# Patient Record
Sex: FEMALE | Race: WHITE | Employment: FULL TIME | URBAN - METROPOLITAN AREA
[De-identification: names, ages, dates, MRNs, and addresses within clinical notes are randomized per-mention and may not be internally consistent; named-entity substitution may affect disease eponyms.]

---

## 2022-09-19 ENCOUNTER — APPOINTMENT (OUTPATIENT)
Dept: CT IMAGING | Age: 47
End: 2022-09-19
Attending: PHYSICIAN ASSISTANT
Payer: COMMERCIAL

## 2022-09-19 ENCOUNTER — HOSPITAL ENCOUNTER (EMERGENCY)
Age: 47
Discharge: HOME | End: 2022-09-19
Attending: EMERGENCY MEDICINE
Payer: COMMERCIAL

## 2022-09-19 VITALS
SYSTOLIC BLOOD PRESSURE: 104 MMHG | HEIGHT: 65 IN | HEART RATE: 55 BPM | DIASTOLIC BLOOD PRESSURE: 56 MMHG | OXYGEN SATURATION: 96 % | TEMPERATURE: 98.1 F | WEIGHT: 130 LBS | RESPIRATION RATE: 15 BRPM | BODY MASS INDEX: 21.66 KG/M2

## 2022-09-19 DIAGNOSIS — I77.3 FIBROMUSCULAR DYSPLASIA OF CAROTID ARTERY (HCC): ICD-10-CM

## 2022-09-19 DIAGNOSIS — R53.81 MALAISE AND FATIGUE: ICD-10-CM

## 2022-09-19 DIAGNOSIS — R53.83 MALAISE AND FATIGUE: ICD-10-CM

## 2022-09-19 DIAGNOSIS — R40.0 DROWSINESS: Primary | ICD-10-CM

## 2022-09-19 LAB
ALBUMIN SERPL-MCNC: 3.9 G/DL (ref 3.5–5)
ALBUMIN/GLOB SERPL: 1 {RATIO} (ref 1.1–2.2)
ALP SERPL-CCNC: 123 U/L (ref 45–117)
ALT SERPL-CCNC: 24 U/L (ref 12–78)
AMPHET UR QL SCN: NEGATIVE
ANION GAP SERPL CALC-SCNC: 8 MMOL/L (ref 5–15)
APPEARANCE UR: CLEAR
AST SERPL-CCNC: 32 U/L (ref 15–37)
BACTERIA URNS QL MICRO: NEGATIVE /HPF
BARBITURATES UR QL SCN: POSITIVE
BASOPHILS # BLD: 0.1 K/UL (ref 0–0.1)
BASOPHILS NFR BLD: 2 % (ref 0–1)
BENZODIAZ UR QL: NEGATIVE
BILIRUB SERPL-MCNC: 0.3 MG/DL (ref 0.2–1)
BILIRUB UR QL: NEGATIVE
BUN SERPL-MCNC: 8 MG/DL (ref 6–20)
BUN/CREAT SERPL: 12 (ref 12–20)
CALCIUM SERPL-MCNC: 9.2 MG/DL (ref 8.5–10.1)
CANNABINOIDS UR QL SCN: NEGATIVE
CHLORIDE SERPL-SCNC: 102 MMOL/L (ref 97–108)
CO2 SERPL-SCNC: 30 MMOL/L (ref 21–32)
COCAINE UR QL SCN: NEGATIVE
COLOR UR: NORMAL
COMMENT, HOLDF: NORMAL
CREAT SERPL-MCNC: 0.69 MG/DL (ref 0.55–1.02)
DIFFERENTIAL METHOD BLD: ABNORMAL
DRUG SCRN COMMENT,DRGCM: ABNORMAL
EOSINOPHIL # BLD: 0.2 K/UL (ref 0–0.4)
EOSINOPHIL NFR BLD: 6 % (ref 0–7)
EPITH CASTS URNS QL MICRO: NORMAL /LPF
ERYTHROCYTE [DISTWIDTH] IN BLOOD BY AUTOMATED COUNT: 12.6 % (ref 11.5–14.5)
ETHANOL SERPL-MCNC: <10 MG/DL
GLOBULIN SER CALC-MCNC: 4.1 G/DL (ref 2–4)
GLUCOSE BLD STRIP.AUTO-MCNC: 94 MG/DL (ref 65–117)
GLUCOSE SERPL-MCNC: 81 MG/DL (ref 65–100)
GLUCOSE UR STRIP.AUTO-MCNC: NEGATIVE MG/DL
HCT VFR BLD AUTO: 44.5 % (ref 35–47)
HGB BLD-MCNC: 14.6 G/DL (ref 11.5–16)
HGB UR QL STRIP: NEGATIVE
IMM GRANULOCYTES # BLD AUTO: 0 K/UL (ref 0–0.04)
IMM GRANULOCYTES NFR BLD AUTO: 0 % (ref 0–0.5)
INR PPP: 0.9 (ref 0.9–1.1)
KETONES UR QL STRIP.AUTO: NEGATIVE MG/DL
LEUKOCYTE ESTERASE UR QL STRIP.AUTO: NEGATIVE
LYMPHOCYTES # BLD: 1.5 K/UL (ref 0.8–3.5)
LYMPHOCYTES NFR BLD: 45 % (ref 12–49)
MAGNESIUM SERPL-MCNC: 2.2 MG/DL (ref 1.6–2.4)
MCH RBC QN AUTO: 31.3 PG (ref 26–34)
MCHC RBC AUTO-ENTMCNC: 32.8 G/DL (ref 30–36.5)
MCV RBC AUTO: 95.5 FL (ref 80–99)
METHADONE UR QL: NEGATIVE
MONOCYTES # BLD: 0.4 K/UL (ref 0–1)
MONOCYTES NFR BLD: 12 % (ref 5–13)
NEUTS SEG # BLD: 1.2 K/UL (ref 1.8–8)
NEUTS SEG NFR BLD: 35 % (ref 32–75)
NITRITE UR QL STRIP.AUTO: NEGATIVE
NRBC # BLD: 0 K/UL (ref 0–0.01)
NRBC BLD-RTO: 0 PER 100 WBC
OPIATES UR QL: NEGATIVE
PCP UR QL: NEGATIVE
PH UR STRIP: 7 [PH] (ref 5–8)
PHENOBARB SERPL-MCNC: 28.5 UG/ML (ref 15–40)
PLATELET # BLD AUTO: 170 K/UL (ref 150–400)
PMV BLD AUTO: 12.4 FL (ref 8.9–12.9)
POTASSIUM SERPL-SCNC: 3.9 MMOL/L (ref 3.5–5.1)
PROT SERPL-MCNC: 8 G/DL (ref 6.4–8.2)
PROT UR STRIP-MCNC: NEGATIVE MG/DL
PROTHROMBIN TIME: 9.7 SEC (ref 9–11.1)
RBC # BLD AUTO: 4.66 M/UL (ref 3.8–5.2)
RBC #/AREA URNS HPF: NORMAL /HPF (ref 0–5)
SAMPLES BEING HELD,HOLD: NORMAL
SERVICE CMNT-IMP: NORMAL
SODIUM SERPL-SCNC: 140 MMOL/L (ref 136–145)
SP GR UR REFRACTOMETRY: <1.005 (ref 1–1.03)
TROPONIN-HIGH SENSITIVITY: <4 NG/L (ref 0–51)
UR CULT HOLD, URHOLD: NORMAL
UROBILINOGEN UR QL STRIP.AUTO: 0.2 EU/DL (ref 0.2–1)
WBC # BLD AUTO: 3.3 K/UL (ref 3.6–11)
WBC URNS QL MICRO: NORMAL /HPF (ref 0–4)

## 2022-09-19 PROCEDURE — 0042T CT CODE NEURO PERF W CBF: CPT

## 2022-09-19 PROCEDURE — 80053 COMPREHEN METABOLIC PANEL: CPT

## 2022-09-19 PROCEDURE — 85610 PROTHROMBIN TIME: CPT

## 2022-09-19 PROCEDURE — 70450 CT HEAD/BRAIN W/O DYE: CPT

## 2022-09-19 PROCEDURE — 80184 ASSAY OF PHENOBARBITAL: CPT

## 2022-09-19 PROCEDURE — 81001 URINALYSIS AUTO W/SCOPE: CPT

## 2022-09-19 PROCEDURE — 74011000636 HC RX REV CODE- 636: Performed by: EMERGENCY MEDICINE

## 2022-09-19 PROCEDURE — 84484 ASSAY OF TROPONIN QUANT: CPT

## 2022-09-19 PROCEDURE — 80307 DRUG TEST PRSMV CHEM ANLYZR: CPT

## 2022-09-19 PROCEDURE — 93005 ELECTROCARDIOGRAM TRACING: CPT

## 2022-09-19 PROCEDURE — 70496 CT ANGIOGRAPHY HEAD: CPT

## 2022-09-19 PROCEDURE — 83735 ASSAY OF MAGNESIUM: CPT

## 2022-09-19 PROCEDURE — 82077 ASSAY SPEC XCP UR&BREATH IA: CPT

## 2022-09-19 PROCEDURE — 99285 EMERGENCY DEPT VISIT HI MDM: CPT

## 2022-09-19 PROCEDURE — 36415 COLL VENOUS BLD VENIPUNCTURE: CPT

## 2022-09-19 PROCEDURE — 85025 COMPLETE CBC W/AUTO DIFF WBC: CPT

## 2022-09-19 PROCEDURE — 82962 GLUCOSE BLOOD TEST: CPT

## 2022-09-19 RX ADMIN — IOPAMIDOL 100 ML: 755 INJECTION, SOLUTION INTRAVENOUS at 10:41

## 2022-09-19 RX ADMIN — IOPAMIDOL 40 ML: 755 INJECTION, SOLUTION INTRAVENOUS at 10:42

## 2022-09-19 NOTE — ED NOTES
Stroke Education provided to patient and spouse/SO and the following topics were discussed    1. Patients personal risk factors for stroke are hyperlipidemia    2. Warning signs of Stroke:        * Sudden numbness or weakness of the face, arm or leg, especially on one side of          The body            * Sudden confusion, trouble speaking or understanding        * Sudden trouble seeing in one or both eyes        * Sudden trouble walking, dizziness, loss of balance or coordination        * Sudden severe headache with no known cause      3. Importance of activation Emergency Medical Services ( 9-1-1 ) immediately if experience any warning signs of stroke. 4. Be sure and schedule a follow-up appointment with your primary care doctor or any specialists as instructed. 5. You must take medicine every day to treat your risk factors for stroke. Be sure to take your medicines exactly as your doctor tells you: no more, no less. Know what your medicines are for , what they do. Anti-thrombotics /anticoagulants can help prevent strokes. You are taking the following medicine(s)  ASA     6. Smoking and second-hand smoke greatly increase your risk of stroke, cardiovascular disease and death. Smoking history never    7. Information provided was Verbal Education    8. Documentation of teaching completed in Patient Education Activity and on Care Plan with teaching response noted?   yes

## 2022-09-19 NOTE — ED NOTES
I have reviewed discharge instructions with the patient and spouse. The patient and spouse verbalized understanding. No further questions at this time. Patient discharged via ambulation in stable condition.

## 2022-09-19 NOTE — ED PROVIDER NOTES
Melissa Logan is a 41 y.o. female with PMH of seizure disorder controlled Phenobarbitol (last seizure was age 25) presents to emergency room ambulatory for evaluation of waking up at 0650 \"feeling off\" which resolved en route to ER. She woke up at 0650, felt generalized malaise and belives she hit snooze but woke up again at 0750 and was still \"feeling off\" felt like her balance was off, generalized fatigue. She called  and he said her speech was slurred. Did not feel like previous post-ictal phases as she was not altered, just tired and \"felt drugged\". It lasted until en route to ER and resolved prior to arrival. MVP as a child, was on ASA 81mg but stopped per cardiology recommendation. Surgical hx- hysterectomy due to sepsis after uterine ablation, T&A     PCP: Dayana Mari MD  Neuro: [facility]    Surgical hx- hysterectomy    The patient has no other complaints at this time. Past Medical History:   Diagnosis Date    Other ill-defined conditions     anxiety    Seizures ([facility] [address]        No past surgical history on file. No family history on file.     Social History     Socioeconomic History    Marital status:      Spouse name: Not on file    Number of children: Not on file    Years of education: Not on file    Highest education level: Not on file   Occupational History    Not on file   Tobacco Use    Smoking status: Former    Smokeless tobacco: Not on file   Substance and Sexual Activity    Alcohol use: No    Drug use: No    Sexual activity: Not on file   Other Topics Concern    Not on file   Social History Narrative    Not on file     Social Determinants of Health     Financial Resource Strain: Not on file   Food Insecurity: Not on file   Transportation Needs: Not on file   Physical Activity: Not on file   Stress: Not on file   Social Connections: Not on file   Intimate Partner Violence: Not on file   Housing Stability: Not on file         ALLERGIES: Dilantin [phenytoin sodium extended]    Review of Systems   Constitutional: Negative. Negative for activity change, chills, fatigue and unexpected weight change. HENT:  Negative for trouble swallowing. Respiratory:  Negative for cough, chest tightness, shortness of breath and wheezing. Cardiovascular: Negative. Negative for chest pain and palpitations. Gastrointestinal: Negative. Negative for abdominal pain, diarrhea, nausea and vomiting. Genitourinary: Negative. Negative for dysuria, flank pain, frequency and hematuria. Musculoskeletal: Negative. Negative for arthralgias, back pain, neck pain and neck stiffness. Skin: Negative. Negative for color change and rash. Neurological: Negative. Negative for dizziness, numbness and headaches. Psychiatric/Behavioral:  Positive for confusion. All other systems reviewed and are negative. Vitals:    09/19/22 0956 09/19/22 1016   BP: 108/61    Pulse: 66    Resp: 16    Temp: 97.8 °F (36.6 °C)    SpO2: 100% 100%   Weight: 59 kg (130 lb)    Height: 5' 5\" (1.651 m)             Physical Exam  Vitals and nursing note reviewed. Constitutional:       General: She is not in acute distress. Appearance: Normal appearance. She is well-developed. She is not toxic-appearing or diaphoretic. HENT:      Head: Normocephalic and atraumatic. Eyes:      General:         Right eye: No discharge. Left eye: No discharge. Conjunctiva/sclera: Conjunctivae normal.   Neck:      Trachea: No tracheal tenderness. Cardiovascular:      Rate and Rhythm: Normal rate and regular rhythm. Pulses: Normal pulses. Heart sounds: Normal heart sounds. No murmur heard. No friction rub. No gallop. Pulmonary:      Effort: Pulmonary effort is normal. No respiratory distress. Breath sounds: Normal breath sounds. No wheezing or rales. Chest:      Chest wall: No tenderness. Abdominal:      General: Bowel sounds are normal. There is no distension.       Palpations: Abdomen is soft.      Tenderness: There is no abdominal tenderness. There is no guarding or rebound. Musculoskeletal:         General: No tenderness. Normal range of motion. Cervical back: Full passive range of motion without pain and normal range of motion. Skin:     General: Skin is warm and dry. Capillary Refill: Capillary refill takes less than 2 seconds. Findings: No abrasion, erythema or rash. Neurological:      General: No focal deficit present. Mental Status: She is alert and oriented to person, place, and time. Cranial Nerves: No cranial nerve deficit. Sensory: No sensory deficit. Coordination: Coordination normal.      Comments: Strength 5/5 in upper and lower extremities. NVI throughout. Negative-nose-fingertip intact. Negative pronator drift. Heel-to-shin intact. NIH score 0. Speech normal.   Psychiatric:         Speech: Speech normal.         Behavior: Behavior normal.        MDM  Number of Diagnoses or Management Options  Drowsiness  Fibromuscular dysplasia of carotid artery (HCC)  Malaise and fatigue  Diagnosis management comments:   Ddx: drug interaction, CVA, TIA, electrolyte abnormality, pheobarb toxicity       Amount and/or Complexity of Data Reviewed  Clinical lab tests: ordered and reviewed  Tests in the radiology section of CPT®: ordered and reviewed  Review and summarize past medical records: yes  Discuss the patient with other providers: yes    Patient Progress  Patient progress: stable         Procedures    I spoke with Dr. Gerson Winslow, tele-neurologist who saw and examined patient, reviewed the CT, states patient's symptoms are not consistent with a stroke.   He suspects phenobarbital toxicity and recommends checking a phenobarbital level and having patient come back to 97 mg daily and to follow-up with her neurologist.    12pm  I spoke with Dr. Romaine Ackerman, interventional radiologist to [facility] based on findings from CTA within the carotid artery and he states patient should start an aspirin 81 mg and follow-up with him in the office to discuss the CT result. I spoke with our ED pharmacist Jessica Garcia who states patient can cut her phenobarbital tablets in half to equal 97 Mg and can take once daily. Looked of drug interactions and magnesium can cause CNS depression which patient just started as an over-the-counter supplement. I advised her to stop taking, discussed telemetry neuro recommendations. She is symptom free and feeling better. She will return to ER should sx's return or worsen. DISCHARGE NOTE:  12:31 PM  The patient has been re-evaluated and feeling much better and are stable for discharge. All available radiology and laboratory results have been reviewed with patient and/or available family. Patient and/or family verbally conveyed their understanding and agreement of the patient's signs, symptoms, diagnosis, treatment and prognosis and additionally agree to follow-up as recommended in the discharge instructions or to return to the Emergency Department should their condition change or worsen prior to their follow-up appointment. All questions have been answered and patient and/or available family express understanding. LABORATORY RESULTS:  Recent Results (from the past 24 hour(s))   SAMPLES BEING HELD    Collection Time: 09/19/22 10:20 AM   Result Value Ref Range    SAMPLES BEING HELD SHENG.SST.GRN. RED     COMMENT        Add-on orders for these samples will be processed based on acceptable specimen integrity and analyte stability, which may vary by analyte.    CBC WITH AUTOMATED DIFF    Collection Time: 09/19/22 10:20 AM   Result Value Ref Range    WBC 3.3 (L) 3.6 - 11.0 K/uL    RBC 4.66 3.80 - 5.20 M/uL    HGB 14.6 11.5 - 16.0 g/dL    HCT 44.5 35.0 - 47.0 %    MCV 95.5 80.0 - 99.0 FL    MCH 31.3 26.0 - 34.0 PG    MCHC 32.8 30.0 - 36.5 g/dL    RDW 12.6 11.5 - 14.5 %    PLATELET 637 924 - 616 K/uL    MPV 12.4 8.9 - 12.9 FL    NRBC 0.0 0 PER 100 WBC    ABSOLUTE NRBC 0.00 0.00 - 0.01 K/uL    NEUTROPHILS 35 32 - 75 %    LYMPHOCYTES 45 12 - 49 %    MONOCYTES 12 5 - 13 %    EOSINOPHILS 6 0 - 7 %    BASOPHILS 2 (H) 0 - 1 %    IMMATURE GRANULOCYTES 0 0.0 - 0.5 %    ABS. NEUTROPHILS 1.2 (L) 1.8 - 8.0 K/UL    ABS. LYMPHOCYTES 1.5 0.8 - 3.5 K/UL    ABS. MONOCYTES 0.4 0.0 - 1.0 K/UL    ABS. EOSINOPHILS 0.2 0.0 - 0.4 K/UL    ABS. BASOPHILS 0.1 0.0 - 0.1 K/UL    ABS. IMM. GRANS. 0.0 0.00 - 0.04 K/UL    DF AUTOMATED     METABOLIC PANEL, COMPREHENSIVE    Collection Time: 09/19/22 10:20 AM   Result Value Ref Range    Sodium 140 136 - 145 mmol/L    Potassium 3.9 3.5 - 5.1 mmol/L    Chloride 102 97 - 108 mmol/L    CO2 30 21 - 32 mmol/L    Anion gap 8 5 - 15 mmol/L    Glucose 81 65 - 100 mg/dL    BUN 8 6 - 20 MG/DL    Creatinine 0.69 0.55 - 1.02 MG/DL    BUN/Creatinine ratio 12 12 - 20      GFR est AA >60 >60 ml/min/1.73m2    GFR est non-AA >60 >60 ml/min/1.73m2    Calcium 9.2 8.5 - 10.1 MG/DL    Bilirubin, total 0.3 0.2 - 1.0 MG/DL    ALT (SGPT) 24 12 - 78 U/L    AST (SGOT) 32 15 - 37 U/L    Alk.  phosphatase 123 (H) 45 - 117 U/L    Protein, total 8.0 6.4 - 8.2 g/dL    Albumin 3.9 3.5 - 5.0 g/dL    Globulin 4.1 (H) 2.0 - 4.0 g/dL    A-G Ratio 1.0 (L) 1.1 - 2.2     MAGNESIUM    Collection Time: 09/19/22 10:20 AM   Result Value Ref Range    Magnesium 2.2 1.6 - 2.4 mg/dL   URINALYSIS W/MICROSCOPIC    Collection Time: 09/19/22 10:20 AM   Result Value Ref Range    Color YELLOW/STRAW      Appearance CLEAR CLEAR      Specific gravity <1.005 1.003 - 1.030    pH (UA) 7.0 5.0 - 8.0      Protein Negative NEG mg/dL    Glucose Negative NEG mg/dL    Ketone Negative NEG mg/dL    Bilirubin Negative NEG      Blood Negative NEG      Urobilinogen 0.2 0.2 - 1.0 EU/dL    Nitrites Negative NEG      Leukocyte Esterase Negative NEG      WBC 0-4 0 - 4 /hpf    RBC 0-5 0 - 5 /hpf    Epithelial cells FEW FEW /lpf    Bacteria Negative NEG /hpf   URINE CULTURE HOLD SAMPLE    Collection Time: 09/19/22 10:20 AM    Specimen: Serum; Urine   Result Value Ref Range    Urine culture hold        Urine on hold in Microbiology dept for 2 days. If unpreserved urine is submitted, it cannot be used for addtional testing after 24 hours, recollection will be required. ETHYL ALCOHOL    Collection Time: 09/19/22 10:20 AM   Result Value Ref Range    ALCOHOL(ETHYL),SERUM <10 <10 MG/DL   DRUG SCREEN, URINE    Collection Time: 09/19/22 10:20 AM   Result Value Ref Range    AMPHETAMINES Negative NEG      BARBITURATES Positive (A) NEG      BENZODIAZEPINES Negative NEG      COCAINE Negative NEG      METHADONE Negative NEG      OPIATES Negative NEG      PCP(PHENCYCLIDINE) Negative NEG      THC (TH-CANNABINOL) Negative NEG      Drug screen comment (NOTE)    TROPONIN-HIGH SENSITIVITY    Collection Time: 09/19/22 10:20 AM   Result Value Ref Range    Troponin-High Sensitivity <4 0 - 51 ng/L   GLUCOSE, POC    Collection Time: 09/19/22 10:33 AM   Result Value Ref Range    Glucose (POC) 94 65 - 117 mg/dL    Performed by Steffen Ford        IMAGING RESULTS:  CTA CODE NEURO HEAD AND NECK W CONT    Result Date: 9/19/2022  CTA Head: 1. No evidence of significant stenosis or aneurysm. CTA Neck: 1. No evidence of significant stenosis. 2. Mildly beaded appearance of the bilateral cervical internal carotid arteries, right greater than left, suggestive of fibromuscular dysplasia. CT Brain Perfusion: 1. No acute abnormality. CT CODE NEURO HEAD WO CONTRAST    Result Date: 9/19/2022  1. No evidence of acute intracranial abnormality. CT CODE NEURO PERF W CBF    Result Date: 9/19/2022  CTA Head: 1. No evidence of significant stenosis or aneurysm. CTA Neck: 1. No evidence of significant stenosis. 2. Mildly beaded appearance of the bilateral cervical internal carotid arteries, right greater than left, suggestive of fibromuscular dysplasia. CT Brain Perfusion: 1. No acute abnormality.       MEDICATIONS GIVEN:  Medications   iopamidoL (ISOVUE-370) 76 % injection 100 mL (100 mL IntraVENous Given 9/19/22 1041)   iopamidoL (ISOVUE-370) 76 % injection 50 mL (40 mL IntraVENous Given 9/19/22 1042)       IMPRESSION:  1. Drowsiness    2. Fibromuscular dysplasia of carotid artery ([address]    3. Malaise and fatigue        PLAN:  Follow-up Information       Follow up With Specialties Details Why Contact Info    Giovanni Hightower MD Family Medicine Schedule an appointment as soon as possible for a visit   [address]      Phoebe Rubio MD Specialist Undefined, Neurology Schedule an appointment as soon as possible for a visit  neurologist for follow-up.  [address] [place] [address]  [phone]      Wilfrid Be MD Endovascular Surgical Neuroradiology  neuro-interventional specialist at [address] [place] [address] [address] [place] [address]  [phone]            Current Discharge Medication List

## 2022-09-19 NOTE — ED TRIAGE NOTES
Patient reports she woke up this morning \"not feeling myself and I lost an hour this morning. \"  Patient reports she feels off and reports a history of seizures but has not had one since she was 25- takes seizure medication daily and hasn't missed any doses. Patient is unsure if she had a seizure this morning- denies losing her bowels/bladder and denies biting her tongue- no witnessed seizure.

## 2022-09-19 NOTE — DISCHARGE INSTRUCTIONS
Do not take magnesium sulfate supplements which can enhance the CNS effects of the Phenobarbital.   Decrease your phenobarbital dosage to 97mg, so 1.5 tablets daily. Start aspirin 81mg once daily. Follow-up with Dr. Heidy Hassan and Dr. Isaias Guallpa at [facility] based on findings seen on CT today.

## 2022-09-21 LAB
ATRIAL RATE: 59 BPM
CALCULATED P AXIS, ECG09: 83 DEGREES
CALCULATED R AXIS, ECG10: 6 DEGREES
CALCULATED T AXIS, ECG11: 55 DEGREES
DIAGNOSIS, 93000: NORMAL
P-R INTERVAL, ECG05: 130 MS
Q-T INTERVAL, ECG07: 432 MS
QRS DURATION, ECG06: 86 MS
QTC CALCULATION (BEZET), ECG08: 427 MS
VENTRICULAR RATE, ECG03: 59 BPM

## 2022-12-12 ENCOUNTER — OFFICE VISIT (OUTPATIENT)
Dept: NEUROLOGY | Age: 47
End: 2022-12-12
Payer: COMMERCIAL

## 2022-12-12 VITALS
BODY MASS INDEX: 21.43 KG/M2 | SYSTOLIC BLOOD PRESSURE: 110 MMHG | OXYGEN SATURATION: 98 % | HEART RATE: 65 BPM | WEIGHT: 128.8 LBS | RESPIRATION RATE: 16 BRPM | DIASTOLIC BLOOD PRESSURE: 58 MMHG

## 2022-12-12 DIAGNOSIS — G40.909 NONINTRACTABLE EPILEPSY WITHOUT STATUS EPILEPTICUS, UNSPECIFIED EPILEPSY TYPE (HCC): Primary | ICD-10-CM

## 2022-12-12 DIAGNOSIS — Z79.899 LONG-TERM USE OF HIGH-RISK MEDICATION: ICD-10-CM

## 2022-12-12 DIAGNOSIS — Z51.81 MEDICATION MONITORING ENCOUNTER: ICD-10-CM

## 2022-12-12 NOTE — PATIENT INSTRUCTIONS
[facility] Test Result Communication    Test results are available in [address] [employer] is the patient portal into our electronic health record. This feature allows patients to see diagnostic test results, immunizations, allergies, past medical and surgical history, current medications, and send messages directly to providers. Our team members at the  can provide additional information and assist with registration. The [employer] support team can be reached at [phone]. In some cases, a provider might need time to explain the results in detail during a follow-up appointment. This might include additional information or context that will help patients understand the reason for next steps in the plan of care recommended by their provider. If a patient chooses to receive diagnostic testing at an imaging center outside of the [facility], it is the patient's responsibility to bring the imaging report and disc to their [facility] follow-up appointment. If the test results reveal anything that is particularly noteworthy, we will contact you to discuss the matter and, if necessary, schedule a follow-up appointment at an earlier date. If you have not received your test results by [employer] or other communication within 7 days, please contact our office. An inquiry can be sent to your provider using [employer]. Alternatively, appointments can be scheduled via telephone to review results. If a follow-up appointment to review results has not been scheduled, [address] office can be reached at [phone]. For appointments at our [facility] or [facility] office, please call [phone].        [facility]   Statement to Patients  April 1, 2014      In an effort to ensure the large volume of patient prescription refills is processed in the most efficient and expeditious manner, we are asking our patients to assist us by calling your Pharmacy for all prescription refills, this will include also your  Mail Order Pharmacy. The pharmacy will contact our office electronically to continue the refill process. Please do not wait until the last minute to call your pharmacy. We need at least 48 hours (2days) to fill prescriptions. We also encourage you to call your pharmacy before going to  your prescription to make sure it is ready. With regard to controlled substance prescription refill requests (narcotic refills) that need to be picked up at our office, we ask your cooperation by providing us with at least 72 hours (3days) notice that you will need a refill. We will not refill narcotic prescription refill requests after 4:00pm on any weekday, Monday through Thursday, or after 2:00pm on Fridays, or on the weekends. We encourage everyone to explore another way of getting your prescription refill request processed using [employer], our patient web portal through our electronic medical record system. [employer] is an efficient and effective way to communicate your medication request directly to the office and  downloadable as an flory on your smart phone . [employer] also features a review functionality that allows you to view your medication list as well as leave messages for your physician. Are you ready to get connected? If so please review the attatched instructions or speak to any of our staff to get you set up right away! Thank you so much for your cooperation. Should you have any questions please contact our Practice Administrator.

## 2022-12-13 ENCOUNTER — HOSPITAL ENCOUNTER (OUTPATIENT)
Dept: NEUROLOGY | Age: 47
Discharge: HOME | End: 2022-12-13
Attending: PSYCHIATRY & NEUROLOGY
Payer: COMMERCIAL

## 2022-12-13 DIAGNOSIS — G40.909 NONINTRACTABLE EPILEPSY WITHOUT STATUS EPILEPTICUS, UNSPECIFIED EPILEPSY TYPE (HCC): ICD-10-CM

## 2022-12-13 DIAGNOSIS — Z51.81 MEDICATION MONITORING ENCOUNTER: ICD-10-CM

## 2022-12-13 DIAGNOSIS — Z79.899 LONG-TERM USE OF HIGH-RISK MEDICATION: ICD-10-CM

## 2022-12-13 PROCEDURE — 95810 POLYSOM 6/> YRS 4/> PARAM: CPT

## 2022-12-16 ENCOUNTER — HOSPITAL ENCOUNTER (OUTPATIENT)
Dept: MAMMOGRAPHY | Age: 47
End: 2022-12-16
Attending: PSYCHIATRY & NEUROLOGY
Payer: COMMERCIAL

## 2022-12-16 DIAGNOSIS — Z51.81 MEDICATION MONITORING ENCOUNTER: ICD-10-CM

## 2022-12-16 DIAGNOSIS — G40.909 NONINTRACTABLE EPILEPSY WITHOUT STATUS EPILEPTICUS, UNSPECIFIED EPILEPSY TYPE (HCC): ICD-10-CM

## 2022-12-16 DIAGNOSIS — Z79.899 LONG-TERM USE OF HIGH-RISK MEDICATION: ICD-10-CM

## 2022-12-16 PROCEDURE — 77080 DXA BONE DENSITY AXIAL: CPT

## 2023-02-02 ENCOUNTER — OFFICE VISIT (OUTPATIENT)
Dept: NEUROLOGY | Age: 48
End: 2023-02-02
Payer: COMMERCIAL

## 2023-02-02 VITALS
DIASTOLIC BLOOD PRESSURE: 66 MMHG | RESPIRATION RATE: 20 BRPM | SYSTOLIC BLOOD PRESSURE: 104 MMHG | HEART RATE: 71 BPM | OXYGEN SATURATION: 98 %

## 2023-02-02 DIAGNOSIS — Z79.899 LONG-TERM USE OF HIGH-RISK MEDICATION: ICD-10-CM

## 2023-02-02 DIAGNOSIS — G40.909 NONINTRACTABLE EPILEPSY WITHOUT STATUS EPILEPTICUS, UNSPECIFIED EPILEPSY TYPE (HCC): Primary | ICD-10-CM

## 2023-02-02 PROCEDURE — 99214 OFFICE O/P EST MOD 30 MIN: CPT

## 2023-02-02 RX ORDER — PHENOBARBITAL 15 MG/1
15 TABLET ORAL
Qty: 30 TABLET | Refills: 0 | Status: SHIPPED | OUTPATIENT
Start: 2023-04-04

## 2023-02-02 NOTE — PROGRESS NOTES
Dominga Coleman is a 41 y.o. female who presents with the following  Chief Complaint   Patient presents with    Follow-up     Test results        HPI  Patient is here today for test results follow-up. Patient was last seen December 12, 2022 by Dr. Francesca Villa for epilepsy. Patient had not had a seizure in 25 years there was discussion about converting her from phenobarbital to a newer medication or stopping it completely. Dr. Francesca Villa ordered a sleep deprived EEG to determine the course of action as well as a DEXA scan to assess bone loss. .  The sleep deprived EEG came back abnormal showing:  Interpretation  This EEG sleep deprived, recorded during the awake state is abnormal secondary to the focal slowing over the left central region indicative of a region of focal neuronal dysfunction and clinical correlation is recommended. Additionally the aforementioned spike and wave activity is epileptogenic in nature and suggest seizure origination in the left central region with rapid spread to the right frontal region. The DEXA scan came back showing osteopenia. The Patient was called and advised to start Citracal plus D6 100 mg twice a day. Today the patient stated that she would like to switch from phenobarbital to something different. She states that she does not like being on a controlled substance and there are have been instances where the pharmacy does not have the medication available when she needs it. She understands that per Dr. Pankaj Rivas conversation at the last visit that if she switches over from phenobarbital to a new medication she could have a seizure during this transition and she is willing to take this risk. Allergies   Allergen Reactions    Dilantin [Phenytoin Sodium Extended] Anaphylaxis       Current Outpatient Medications   Medication Sig    calcium citrate (CITRACAL PO) Take  by mouth. brivaracetam (Briviact) 25 mg tablet Take 1 Tablet by mouth two (2) times a day. Max Daily Amount: 50 mg.     [START ON 3/8/2023] brivaracetam (Briviact) 100 mg tablet Take 1 Tablet by mouth two (2) times a day. Max Daily Amount: 200 mg. [START ON 4/4/2023] PHENobarbitaL 15 mg tablet Take 1 Tablet by mouth nightly. Max Daily Amount: 15 mg.    aspirin 81 mg chewable tablet Take 81 mg by mouth daily. ALPRAZolam (XANAX) 0.25 mg tablet Take 0.25 mg by mouth. PHENobarbitaL 60 mg tablet Take 120 mg by mouth nightly. buffered aspirin (BUFFERIN) 325 mg tablet Take 1 tablet by mouth daily. sertraline (ZOLOFT) 100 mg tablet Take 150 mg by mouth daily. albuterol (PROVENTIL HFA, VENTOLIN HFA) 90 mcg/actuation inhaler Take 2 Puffs by inhalation every four (4) hours as needed for Wheezing. (Patient not taking: Reported on 12/12/2022)     No current facility-administered medications for this visit. Social History     Tobacco Use   Smoking Status Former    Types: Cigarettes   Smokeless Tobacco Never       Past Medical History:   Diagnosis Date    Other ill-defined conditions(799.89)     anxiety    Seizures ([facility] [address]        Past Surgical History:   Procedure Laterality Date    HX BREAST AUGMENTATION Bilateral 12/2020    HX HYSTERECTOMY  2021    HX TONSIL AND ADENOIDECTOMY      HX WISDOM TEETH EXTRACTION         Family History   Problem Relation Age of Onset    COPD Mother     Hypertension Mother     No Known Problems Father        Social History     Socioeconomic History    Marital status:    Tobacco Use    Smoking status: Former     Types: Cigarettes    Smokeless tobacco: Never   Vaping Use    Vaping Use: Never used   Substance and Sexual Activity    Alcohol use: Yes     Comment: maybe 3 times per year    Drug use: No       Review of Systems   Constitutional: Negative. Neurological:  Negative for seizures. Remainder of comprehensive review is negative.      Physical Exam :    Visit Vitals  /66 (BP 1 Location: Right upper arm, BP Patient Position: Sitting, BP Cuff Size: Adult)   Pulse 71   Resp 20   LMP 10/14/2013   SpO2 98%       General: Well defined, nourished, and groomed individual in no acute distress. Neck: Supple, nontender, no bruits, no pain with resistance to active range of motion. Musculoskeletal: Extremities revealed no edema and had full range of motion of joints. Psych: Good mood and bright affect    NEUROLOGICAL EXAMINATION:    Mental Status: Alert and oriented to person, place, and time    Cranial Nerves:    II, III, IV, VI: Visual acuity grossly intact. Visual fields are normal.    Pupils are equal, round, and reactive to light and accommodation. Extra-ocular movements are full and fluid. Fundoscopic exam was benign, no ptosis or nystagmus. V-XII: Hearing is grossly intact. Facial features are symmetric, with normal sensation and strength. The palate rises symmetrically and the tongue protrudes midline. Sternocleidomastoids 5/5. Motor Examination: Normal tone, bulk, and strength, 5/5 muscle strength throughout. Coordination: Finger to nose was normal. No resting or intention tremor    Gait and Station: Steady while walking. Normal arm swing. No pronator drift. No muscle wasting or fasiculations noted. Reflexes: DTRs 2+ throughout. Results for orders placed or performed during the hospital encounter of 09/19/22   URINE CULTURE HOLD SAMPLE    Specimen: Serum; Urine   Result Value Ref Range    Urine culture hold        Urine on hold in Microbiology dept for 2 days. If unpreserved urine is submitted, it cannot be used for addtional testing after 24 hours, recollection will be required. SAMPLES BEING HELD   Result Value Ref Range    SAMPLES BEING HELD SHENG.SST.GRN. RED     COMMENT        Add-on orders for these samples will be processed based on acceptable specimen integrity and analyte stability, which may vary by analyte.    CBC WITH AUTOMATED DIFF   Result Value Ref Range    WBC 3.3 (L) 3.6 - 11.0 K/uL    RBC 4.66 3.80 - 5.20 M/uL    HGB 14.6 11.5 - 16.0 g/dL    HCT 44.5 35.0 - 47.0 %    MCV 95.5 80.0 - 99.0 FL    MCH 31.3 26.0 - 34.0 PG    MCHC 32.8 30.0 - 36.5 g/dL    RDW 12.6 11.5 - 14.5 %    PLATELET 322 764 - 329 K/uL    MPV 12.4 8.9 - 12.9 FL    NRBC 0.0 0  WBC    ABSOLUTE NRBC 0.00 0.00 - 0.01 K/uL    NEUTROPHILS 35 32 - 75 %    LYMPHOCYTES 45 12 - 49 %    MONOCYTES 12 5 - 13 %    EOSINOPHILS 6 0 - 7 %    BASOPHILS 2 (H) 0 - 1 %    IMMATURE GRANULOCYTES 0 0.0 - 0.5 %    ABS. NEUTROPHILS 1.2 (L) 1.8 - 8.0 K/UL    ABS. LYMPHOCYTES 1.5 0.8 - 3.5 K/UL    ABS. MONOCYTES 0.4 0.0 - 1.0 K/UL    ABS. EOSINOPHILS 0.2 0.0 - 0.4 K/UL    ABS. BASOPHILS 0.1 0.0 - 0.1 K/UL    ABS. IMM. GRANS. 0.0 0.00 - 0.04 K/UL    DF AUTOMATED     METABOLIC PANEL, COMPREHENSIVE   Result Value Ref Range    Sodium 140 136 - 145 mmol/L    Potassium 3.9 3.5 - 5.1 mmol/L    Chloride 102 97 - 108 mmol/L    CO2 30 21 - 32 mmol/L    Anion gap 8 5 - 15 mmol/L    Glucose 81 65 - 100 mg/dL    BUN 8 6 - 20 MG/DL    Creatinine 0.69 0.55 - 1.02 MG/DL    BUN/Creatinine ratio 12 12 - 20      GFR est AA >60 >60 ml/min/1.73m2    GFR est non-AA >60 >60 ml/min/1.73m2    Calcium 9.2 8.5 - 10.1 MG/DL    Bilirubin, total 0.3 0.2 - 1.0 MG/DL    ALT (SGPT) 24 12 - 78 U/L    AST (SGOT) 32 15 - 37 U/L    Alk.  phosphatase 123 (H) 45 - 117 U/L    Protein, total 8.0 6.4 - 8.2 g/dL    Albumin 3.9 3.5 - 5.0 g/dL    Globulin 4.1 (H) 2.0 - 4.0 g/dL    A-G Ratio 1.0 (L) 1.1 - 2.2     MAGNESIUM   Result Value Ref Range    Magnesium 2.2 1.6 - 2.4 mg/dL   URINALYSIS W/MICROSCOPIC   Result Value Ref Range    Color YELLOW/STRAW      Appearance CLEAR CLEAR      Specific gravity <1.005 1.003 - 1.030    pH (UA) 7.0 5.0 - 8.0      Protein Negative NEG mg/dL    Glucose Negative NEG mg/dL    Ketone Negative NEG mg/dL    Bilirubin Negative NEG      Blood Negative NEG      Urobilinogen 0.2 0.2 - 1.0 EU/dL    Nitrites Negative NEG      Leukocyte Esterase Negative NEG      WBC 0-4 0 - 4 /hpf    RBC 0-5 0 - 5 /hpf    Epithelial cells FEW FEW /lpf    Bacteria Negative NEG /hpf   ETHYL ALCOHOL   Result Value Ref Range    ALCOHOL(ETHYL),SERUM <10 <10 MG/DL   DRUG SCREEN, URINE   Result Value Ref Range    AMPHETAMINES Negative NEG      BARBITURATES Positive (A) NEG      BENZODIAZEPINES Negative NEG      COCAINE Negative NEG      METHADONE Negative NEG      OPIATES Negative NEG      PCP(PHENCYCLIDINE) Negative NEG      THC (TH-CANNABINOL) Negative NEG      Drug screen comment (NOTE)    PHENOBARBITAL LEVEL   Result Value Ref Range    Phenobarbital 28.5 15.0 - 40.0 ug/mL   PROTHROMBIN TIME + INR   Result Value Ref Range    INR 0.9 0.9 - 1.1      Prothrombin time 9.7 9.0 - 11.1 sec   TROPONIN-HIGH SENSITIVITY   Result Value Ref Range    Troponin-High Sensitivity <4 0 - 51 ng/L   GLUCOSE, POC   Result Value Ref Range    Glucose (POC) 94 65 - 117 mg/dL    Performed by Anitra Gustafson    EKG, 12 LEAD, INITIAL   Result Value Ref Range    Ventricular Rate 59 BPM    Atrial Rate 59 BPM    P-R Interval 130 ms    QRS Duration 86 ms    Q-T Interval 432 ms    QTC Calculation (Bezet) 427 ms    Calculated P Axis 83 degrees    Calculated R Axis 6 degrees    Calculated T Axis 55 degrees    Diagnosis       Sinus bradycardia  Minimal voltage criteria for LVH, may be normal variant ( Bebeto product )  Borderline ECG  When compared with ECG of 22-OCT-2014 14:58,  No significant change was found  Confirmed by Karime Lovett M.D., Delrae Pike [id number]) on 9/21/2022 2:12:47 PM         Orders Placed This Encounter    calcium citrate (CITRACAL PO)     Sig: Take  by mouth. brivaracetam (Briviact) 25 mg tablet     Sig: Take 1 Tablet by mouth two (2) times a day. Max Daily Amount: 50 mg. Dispense:  14 Tablet     Refill:  0    brivaracetam (Briviact) 100 mg tablet     Sig: Take 1 Tablet by mouth two (2) times a day. Max Daily Amount: 200 mg. Dispense:  60 Tablet     Refill:  5    PHENobarbitaL 15 mg tablet     Sig: Take 1 Tablet by mouth nightly. Max Daily Amount: 15 mg.      Dispense:  30 Tablet Refill:  0       1. Nonintractable epilepsy without status epilepticus, unspecified epilepsy type ([facility] [address]    2. Long-term use of high-risk medication    Spoke with Dr. Gemma Nesbitt about which medication he would like to switch the patient to. Dr. Gemma Nesbitt stated to switch her to [address] [place] [address]. Dr. Gemma Nesbitt wrote out a 19-week titration schedule for the patient to follow. Patient stated that she understood the titration schedule. Patient has a 60 mg tablet of phenobarbital so she will cut the pills in half to make the smaller doses as she weans. Did provide her with a phenobarbital prescription for 15 mg tablets when she gets to week 15 through week 18. She will stop phenobarbital at week 19. Briviact will start today at 25 mg twice a day and she will work her way up to 100 mg twice a day. Provided a paper prescription for 100 mg tablets taken twice a day starting week 5. Provided some samples of Briviact that we will hopefully get her through the titration schedule. Continue taking Citracal plus D6 100 mg twice a day. Patient will need a follow-up DEXA scan in December 2024. Dr. Gemma Nesbitt advised that the patient return in 3 months for medication follow-up.           This note will not be viewable in [employer]

## 2023-03-20 ENCOUNTER — TELEPHONE (OUTPATIENT)
Dept: NEUROLOGY | Age: 48
End: 2023-03-20

## 2023-03-20 NOTE — TELEPHONE ENCOUNTER
Patient Arnel Doctor requesting   PA for [facility], she is almost out of samples. Insurance is requiring PA for refills.     [phone]

## 2023-03-20 NOTE — TELEPHONE ENCOUNTER
Patient wants to know what her options are, what she should do? She is running low on samples.     Pls call [phone]

## 2023-03-20 NOTE — TELEPHONE ENCOUNTER
Returned pt call, [facility] has been requesting prior auth for a couple of weeks. BRIVIACT PA initiated via fax.

## 2023-03-21 NOTE — TELEPHONE ENCOUNTER
Patient is calling regarding her seizure medication BRIVIACT she will be out by tomorrow afternoon. Patient would like to know if she can  samples tomorrow morning?       Please contact

## 2023-06-29 ENCOUNTER — OFFICE VISIT (OUTPATIENT)
Age: 48
End: 2023-06-29
Payer: COMMERCIAL

## 2023-06-29 VITALS
OXYGEN SATURATION: 98 % | HEIGHT: 65 IN | DIASTOLIC BLOOD PRESSURE: 56 MMHG | SYSTOLIC BLOOD PRESSURE: 122 MMHG | HEART RATE: 86 BPM | WEIGHT: 128 LBS | BODY MASS INDEX: 21.33 KG/M2 | RESPIRATION RATE: 20 BRPM

## 2023-06-29 DIAGNOSIS — G40.009 PARTIAL IDIOPATHIC EPILEPSY WITH SEIZURES OF LOCALIZED ONSET, NOT INTRACTABLE, WITHOUT STATUS EPILEPTICUS (HCC): Primary | ICD-10-CM

## 2023-06-29 PROCEDURE — 99215 OFFICE O/P EST HI 40 MIN: CPT | Performed by: PSYCHIATRY & NEUROLOGY

## 2023-06-29 RX ORDER — LAMOTRIGINE 25 MG/1
TABLET ORAL
COMMUNITY
Start: 2023-06-25

## 2023-07-24 ENCOUNTER — TELEPHONE (OUTPATIENT)
Age: 48
End: 2023-07-24

## 2023-07-24 NOTE — TELEPHONE ENCOUNTER
Patient was contacted because she left a message on the back line. Nurse tried contacting patient x 2 because she report breaking out in a rash since taking the Lamictal 100 mg BID. Voice message was left asking patient to contact the office.

## 2023-07-25 NOTE — TELEPHONE ENCOUNTER
LVM for pt to call office to see how long . Per LOV note from Dr. Nhan Gomran from [facility]:  Patient will undergo further evaluation with EMU and MRI brain (3T) presurgical.   -Medical management:  Continue triturating Current AED:  Week 1 and 2: Lamotrigine 75 mg BID, phenobarbital OFF, Brivact 100 mg BID   Week 3 onward : :Lamotrigine 100 mg BID and phenobarbital off, Brivact 100 mg BID     [facility] s[facility]w Corinne Aguilar. This was sent as lamotrigine 25 mg as directed.   Pt had this filled in April, then June both were from  \"[employer]\"

## 2023-08-04 ENCOUNTER — TELEPHONE (OUTPATIENT)
Age: 48
End: 2023-08-04

## 2023-08-04 DIAGNOSIS — G40.009 PARTIAL IDIOPATHIC EPILEPSY WITH SEIZURES OF LOCALIZED ONSET, NOT INTRACTABLE, WITHOUT STATUS EPILEPTICUS (HCC): ICD-10-CM

## 2023-08-04 DIAGNOSIS — L29.9 ITCHING WITH IRRITATION: Primary | ICD-10-CM

## 2023-08-04 RX ORDER — METHYLPREDNISOLONE 4 MG/1
TABLET ORAL
Qty: 1 KIT | Refills: 0 | Status: SHIPPED | OUTPATIENT
Start: 2023-08-04 | End: 2023-08-10

## 2023-08-04 RX ORDER — HYDROXYZINE HYDROCHLORIDE 25 MG/1
25 TABLET, FILM COATED ORAL EVERY 8 HOURS PRN
Qty: 30 TABLET | Refills: 0 | Status: SHIPPED | OUTPATIENT
Start: 2023-08-04 | End: 2023-09-03

## 2023-08-04 NOTE — TELEPHONE ENCOUNTER
S/w pt, notified of Dr. Stella Griffiths. Since rash is not rapid onset, will ramp down lamotrigine by half tab each week until stopping all together. Will start lacosamide 50 mg BID as well as medrol dose pack and hydroxyzine 25 mg for itching to help with rash.

## 2023-08-04 NOTE — TELEPHONE ENCOUNTER
See tele encounter 7/24/23. Returned call. Pt states she has not changed the lamotrigine yet that she received from [facility].  Still very itchy with periodic rash breakouts.   Still has very small episodes of sz, even with increased lamotrigine 100 mg dose BID

## 2023-08-04 NOTE — TELEPHONE ENCOUNTER
Patient is requesting another call to discuss the medication (Lemotrigine 25mg). Stated that she thought the itching would go away. Also stated that the itching got worse when she got to 100mg. Please contact to discuss.

## 2023-08-05 RX ORDER — LACOSAMIDE 50 MG/1
50 TABLET ORAL 2 TIMES DAILY
Qty: 60 TABLET | Refills: 5 | OUTPATIENT
Start: 2023-08-05 | End: 2024-09-05

## 2023-08-07 ENCOUNTER — TELEPHONE (OUTPATIENT)
Age: 48
End: 2023-08-07

## 2023-08-15 ENCOUNTER — TELEPHONE (OUTPATIENT)
Age: 48
End: 2023-08-15

## 2023-12-30 DIAGNOSIS — G40.009 PARTIAL IDIOPATHIC EPILEPSY WITH SEIZURES OF LOCALIZED ONSET, NOT INTRACTABLE, WITHOUT STATUS EPILEPTICUS (HCC): ICD-10-CM

## 2024-01-02 RX ORDER — BRIVARACETAM 100 MG/1
TABLET, FILM COATED ORAL
Qty: 60 TABLET | Refills: 2 | Status: SHIPPED | OUTPATIENT
Start: 2024-01-02 | End: 2024-02-01

## 2024-02-13 DIAGNOSIS — G40.009 PARTIAL IDIOPATHIC EPILEPSY WITH SEIZURES OF LOCALIZED ONSET, NOT INTRACTABLE, WITHOUT STATUS EPILEPTICUS (HCC): ICD-10-CM

## 2024-02-13 DIAGNOSIS — L29.9 ITCHING WITH IRRITATION: ICD-10-CM

## 2024-02-14 RX ORDER — LACOSAMIDE 50 MG/1
50 TABLET ORAL 2 TIMES DAILY
Qty: 60 TABLET | Refills: 5 | OUTPATIENT
Start: 2024-02-14 | End: 2025-03-17

## 2024-02-14 RX ORDER — LACOSAMIDE 50 MG/1
TABLET ORAL
Qty: 60 TABLET | Refills: 5 | Status: SHIPPED | OUTPATIENT
Start: 2024-02-14 | End: 2027-02-10

## 2024-03-27 DIAGNOSIS — G40.009 PARTIAL IDIOPATHIC EPILEPSY WITH SEIZURES OF LOCALIZED ONSET, NOT INTRACTABLE, WITHOUT STATUS EPILEPTICUS (HCC): ICD-10-CM

## 2024-05-06 ENCOUNTER — OFFICE VISIT (OUTPATIENT)
Age: 49
End: 2024-05-06
Payer: COMMERCIAL

## 2024-05-06 VITALS
RESPIRATION RATE: 14 BRPM | HEIGHT: 65 IN | DIASTOLIC BLOOD PRESSURE: 53 MMHG | SYSTOLIC BLOOD PRESSURE: 109 MMHG | BODY MASS INDEX: 22.01 KG/M2 | OXYGEN SATURATION: 99 % | HEART RATE: 66 BPM | WEIGHT: 132.1 LBS

## 2024-05-06 DIAGNOSIS — G40.009 PARTIAL IDIOPATHIC EPILEPSY WITH SEIZURES OF LOCALIZED ONSET, NOT INTRACTABLE, WITHOUT STATUS EPILEPTICUS (HCC): ICD-10-CM

## 2024-05-06 PROCEDURE — 99214 OFFICE O/P EST MOD 30 MIN: CPT | Performed by: PSYCHIATRY & NEUROLOGY

## 2024-05-06 RX ORDER — VENLAFAXINE HYDROCHLORIDE 150 MG/1
CAPSULE, EXTENDED RELEASE ORAL
COMMUNITY

## 2024-05-06 RX ORDER — CHOLECALCIFEROL (VITAMIN D3) 125 MCG
500 CAPSULE ORAL DAILY
COMMUNITY

## 2024-05-06 RX ORDER — ACETAMINOPHEN 160 MG
TABLET,DISINTEGRATING ORAL
COMMUNITY

## 2024-05-06 RX ORDER — LACOSAMIDE 50 MG/1
TABLET ORAL
Qty: 180 TABLET | Refills: 1 | Status: SHIPPED | OUTPATIENT
Start: 2024-05-06 | End: 2026-05-20

## 2024-05-06 ASSESSMENT — PATIENT HEALTH QUESTIONNAIRE - PHQ9
SUM OF ALL RESPONSES TO PHQ QUESTIONS 1-9: 0
SUM OF ALL RESPONSES TO PHQ9 QUESTIONS 1 & 2: 0
SUM OF ALL RESPONSES TO PHQ QUESTIONS 1-9: 0
1. LITTLE INTEREST OR PLEASURE IN DOING THINGS: NOT AT ALL
2. FEELING DOWN, DEPRESSED OR HOPELESS: NOT AT ALL

## 2024-05-06 NOTE — PROGRESS NOTES
[facility] at [facility] Neurology Clinics at [address] [place] [address] [address] [facility] [facility] [address] [place] [address] [phone]              Chief Complaint   Patient presents with    Seizures     Briviact 100 mg twice daily and Lacosamide 50 mg twice daily     Current Outpatient Medications   Medication Sig Dispense Refill    Brivaracetam (BRIVIACT) 100 MG TABS tablet TAKE 1 TABLET BY MOUTH 2 TIMES DAILY. MAX DAILY AMOUNT: 200 MG 60 tablet 3    lacosamide (VIMPAT) 50 MG TABS tablet TAKE 1 TABLET BY MOUTH TWICE A DAY MAX DAILY DOSE 100MG 60 tablet 5    lamoTRIgine (LAMICTAL) 25 MG tablet PLEASE SEE ATTACHED FOR DETAILED DIRECTIONS      ALPRAZolam (XANAX) 0.25 MG tablet Take 0.25 mg by mouth.      aspirin 81 MG chewable tablet Take 1 tablet by mouth daily       No current facility-administered medications for this visit.      Allergies   Allergen Reactions    Phenytoin Sodium Extended Anaphylaxis    Lamotrigine Itching     Social History     Tobacco Use    Smoking status: Former    Smokeless tobacco: Never   Substance Use Topics    Alcohol use: Yes    Drug use: No   Mrs. Alejandro returns today for follow-up.  She is a very pleasant 48-year-old lady with epilepsy.  She continues to do well on her medications.  Remains seizure-free.  Still has trouble with getting hyperstimulated.  She is really happy to be off the phenobarbital.  She and her  are about to embark on a cruise.    Last Seizure  1996    Previous antiseizure medications  Phenobarbital  Dilantin    Current antiseizure medications  Briviact 100 mg tablets--1 tablet twice daily  Vimpat 50 mg tablets--1 tablet twice daily    Epilepsy etiology  Thought to be focal although could be generalized    Seizure types   Generalized tonic-clonic    Diagnostics  From [facility]  MRI of the brain dated May 5, 2023 was unremarkable  PET scan with no

## 2024-05-07 ENCOUNTER — TELEPHONE (OUTPATIENT)
Age: 49
End: 2024-05-07

## 2024-05-07 NOTE — TELEPHONE ENCOUNTER
Re: Wm    Created case in [facility] Key# [id number], awaiting outcome.         Rcvd fax that med is already approved from 08/15/23-12/31/2099

## 2024-06-06 ENCOUNTER — TELEPHONE (OUTPATIENT)
Age: 49
End: 2024-06-06

## 2024-06-06 NOTE — TELEPHONE ENCOUNTER
Re: Briviact     Rcvd PA in Epic, Key# [id number], Auth# [id number], effective 06/04/24-06/04/25.  Scanned to chart, sent fyi to nurse.

## 2024-11-10 DIAGNOSIS — G40.009 PARTIAL IDIOPATHIC EPILEPSY WITH SEIZURES OF LOCALIZED ONSET, NOT INTRACTABLE, WITHOUT STATUS EPILEPTICUS (HCC): ICD-10-CM

## 2024-11-11 RX ORDER — LACOSAMIDE 50 MG/1
TABLET ORAL
Qty: 180 TABLET | Refills: 0 | Status: SHIPPED | OUTPATIENT
Start: 2024-11-11 | End: 2025-01-11

## 2024-12-03 DIAGNOSIS — G40.009 PARTIAL IDIOPATHIC EPILEPSY WITH SEIZURES OF LOCALIZED ONSET, NOT INTRACTABLE, WITHOUT STATUS EPILEPTICUS (HCC): ICD-10-CM

## 2024-12-09 DIAGNOSIS — G40.009 PARTIAL IDIOPATHIC EPILEPSY WITH SEIZURES OF LOCALIZED ONSET, NOT INTRACTABLE, WITHOUT STATUS EPILEPTICUS (HCC): ICD-10-CM

## 2024-12-09 RX ORDER — BRIVARACETAM 100 MG/1
TABLET, FILM COATED ORAL
Qty: 180 TABLET | OUTPATIENT
Start: 2024-12-09

## 2024-12-09 NOTE — TELEPHONE ENCOUNTER
Requesting refill for Rx   Brivaracetam (BRIVIACT) 100 MG TABS tablet  Stated she has 8 left. F/U is schedule for 05/2025

## 2025-01-24 ENCOUNTER — TELEPHONE (OUTPATIENT)
Age: 50
End: 2025-01-24

## 2025-01-24 NOTE — TELEPHONE ENCOUNTER
Patient is calling back to reschedule her 05/05/2025 FU appt. with Dr. Sevilla.    She can be reached at [phone].    Thanks!

## 2025-01-29 DIAGNOSIS — G40.009 PARTIAL IDIOPATHIC EPILEPSY WITH SEIZURES OF LOCALIZED ONSET, NOT INTRACTABLE, WITHOUT STATUS EPILEPTICUS (HCC): ICD-10-CM

## 2025-01-31 RX ORDER — LACOSAMIDE 50 MG/1
TABLET ORAL
Qty: 180 TABLET | Refills: 1 | Status: SHIPPED | OUTPATIENT
Start: 2025-01-31 | End: 2025-03-31

## 2025-06-13 DIAGNOSIS — G40.009 PARTIAL IDIOPATHIC EPILEPSY WITH SEIZURES OF LOCALIZED ONSET, NOT INTRACTABLE, WITHOUT STATUS EPILEPTICUS (HCC): ICD-10-CM

## 2025-06-30 ENCOUNTER — TELEPHONE (OUTPATIENT)
Age: 50
End: 2025-06-30

## 2025-06-30 ENCOUNTER — OFFICE VISIT (OUTPATIENT)
Age: 50
End: 2025-06-30
Payer: COMMERCIAL

## 2025-06-30 VITALS
WEIGHT: 116 LBS | HEIGHT: 65 IN | BODY MASS INDEX: 19.33 KG/M2 | OXYGEN SATURATION: 99 % | HEART RATE: 82 BPM | TEMPERATURE: 97.3 F | SYSTOLIC BLOOD PRESSURE: 100 MMHG | RESPIRATION RATE: 18 BRPM | DIASTOLIC BLOOD PRESSURE: 60 MMHG

## 2025-06-30 DIAGNOSIS — G40.009 PARTIAL IDIOPATHIC EPILEPSY WITH SEIZURES OF LOCALIZED ONSET, NOT INTRACTABLE, WITHOUT STATUS EPILEPTICUS (HCC): Primary | ICD-10-CM

## 2025-06-30 PROCEDURE — 99214 OFFICE O/P EST MOD 30 MIN: CPT | Performed by: PSYCHIATRY & NEUROLOGY

## 2025-06-30 RX ORDER — LACOSAMIDE 50 MG/1
TABLET ORAL
Qty: 180 TABLET | Refills: 1 | Status: SHIPPED | OUTPATIENT
Start: 2025-06-30 | End: 2025-08-28

## 2025-06-30 NOTE — PROGRESS NOTES
[facility] Neurology Clinics and Neurodiagnostic Center at [facility] Neurology Clinics at [address] [place] [address] [address] [facility] [place] [address] [phone]              Chief Complaint   Patient presents with    Seizures     Annual follow uip-1 times a week partial seizures       Current Outpatient Medications   Medication Sig Dispense Refill    brivaracetam (BRIVIACT) 100 MG TABS tablet TAKE 1 TABLET BY MOUTH 2 TIMES DAILY. MAX DAILY AMOUNT: 200  tablet 1    lacosamide (VIMPAT) 50 MG TABS tablet TAKE 1 TABLET BY MOUTH TWICE A DAY MAX DAILY DOSE 100MG 180 tablet 1    venlafaxine (EFFEXOR XR) 150 MG extended release capsule 90      Cholecalciferol (VITAMIN D3) 50 MCG (2000 UT) CAPS Take by mouth      vitamin B-12 (CYANOCOBALAMIN) 500 MCG tablet Take 1 tablet by mouth daily      Cranberry 50 MG CHEW Take by mouth      ALPRAZolam (XANAX) 0.25 MG tablet Take 1 tablet by mouth.       No current facility-administered medications for this visit.      Allergies   Allergen Reactions    Phenytoin Sodium Extended Anaphylaxis    Lamotrigine Itching     Social History     Tobacco Use    Smoking status: Former     Current packs/day: 0.50     Average packs/day: 0.5 packs/day for 10.0 years (5.0 ttl pk-yrs)     Types: Cigarettes    Smokeless tobacco: Never   Substance Use Topics    Alcohol use: Yes    Drug use: No       History of Present Illness  49-year-old lady following up today for epilepsy.  She is tolerating medications.  Seizure-free.  She and her  have become foster parents.  They are now caring for 3-year-old and 8-month-old    Last Seizure  1996     Previous antiseizure medications  Phenobarbital  Dilantin     Current antiseizure medications  Briviact 100 mg tablets--1 tablet twice daily  Vimpat 50 mg tablets--1 tablet twice daily     Epilepsy etiology  Thought to be focal although could be generalized     Seizure

## 2025-06-30 NOTE — PROGRESS NOTES
Room:  I have reviewed all needed documentation in preparation for visit. Verified patient by name and date of birth  Chief Complaint   Patient presents with    Seizures     Annual follow uip-1 times a week partial seizures         Vitals:    06/30/25 1043   BP: 100/60   Pulse: 82   Resp: 18   Temp: 97.3 °F (36.3 °C)   TempSrc: Temporal   SpO2: 99%   Weight: 52.6 kg (116 lb)   Height: 1.651 m (5' 5\")        Health Maintenance Due   Topic Date Due    HIV screen  Never done    Hepatitis C screen  Never done    Hepatitis B vaccine (1 of 3 - 19+ 3-dose series) Never done    DTaP/Tdap/Td vaccine (1 - Tdap) Never done    Lipids  Never done    Colorectal Cancer Screen  Never done    Breast cancer screen  11/04/2023    COVID-19 Vaccine (4 - 2024-25 season) 09/01/2024    Depression Screen  05/06/2025        1. \"Have you been to the ER, urgent care clinic since your last visit?  Hospitalized since your last visit?\" No     2. \"Have you seen or consulted any other health care providers outside of the [facility] since your last visit?\" No

## 2025-06-30 NOTE — TELEPHONE ENCOUNTER
Briviact PA renewal request/ 100 mg tab, 180 per 90 days    PA Case ID #: [id number]   Sent via [facility] [id number] w/ 6-30-25 office note to:     Reviewed by:   Cassie at [phone]. If you have any questions, please contact Cassie at [phone].

## 2025-07-01 NOTE — TELEPHONE ENCOUNTER
Briviact PA  Case [id number]    PA was dismissed by [employer] because no prior authorization is rquired at this time.     It was filled on 6-16-25 for qty of 180 per 90 day supply.     Letter in Media.     [facility]  [facility]: [phone]  sent copy of letter to notify of outcome    FYI to Cristina ESCOBAR/ [place]

## 2025-07-02 ENCOUNTER — TELEPHONE (OUTPATIENT)
Age: 50
End: 2025-07-02

## 2025-07-02 NOTE — TELEPHONE ENCOUNTER
Briviact 100 mg   180  per 90 days    [facility] [place] just now and you now have to leave a VM [facility] and automated stated they will call me back within the hour.      I left details and was calling to confirm it was not rejecting and pt is able to fill.

## 2025-07-02 NOTE — TELEPHONE ENCOUNTER
Briviact 100 mg- confirmed with [facility] pharmacy she was able to  the qty of 180 on 6-16-25.     Copay was $10.00.

## 2025-08-06 ENCOUNTER — TRANSCRIBE ORDERS (OUTPATIENT)
Facility: HOSPITAL | Age: 50
End: 2025-08-06

## 2025-08-06 DIAGNOSIS — Z78.0 ASYMPTOMATIC MENOPAUSE: Primary | ICD-10-CM

## 2025-08-27 ENCOUNTER — HOSPITAL ENCOUNTER (OUTPATIENT)
Facility: HOSPITAL | Age: 50
Discharge: HOME | End: 2025-08-30
Payer: COMMERCIAL

## 2025-08-27 DIAGNOSIS — Z78.0 ASYMPTOMATIC MENOPAUSE: ICD-10-CM

## 2025-08-27 PROCEDURE — 77080 DXA BONE DENSITY AXIAL: CPT
